# Patient Record
Sex: FEMALE | Race: AMERICAN INDIAN OR ALASKA NATIVE
[De-identification: names, ages, dates, MRNs, and addresses within clinical notes are randomized per-mention and may not be internally consistent; named-entity substitution may affect disease eponyms.]

---

## 2019-10-18 ENCOUNTER — HOSPITAL ENCOUNTER (EMERGENCY)
Dept: HOSPITAL 5 - ED | Age: 29
Discharge: OTHER INSTITUTION | End: 2019-10-18
Payer: COMMERCIAL

## 2019-10-18 VITALS — DIASTOLIC BLOOD PRESSURE: 40 MMHG | SYSTOLIC BLOOD PRESSURE: 98 MMHG

## 2019-10-18 DIAGNOSIS — J86.9: Primary | ICD-10-CM

## 2019-10-18 DIAGNOSIS — F17.200: ICD-10-CM

## 2019-10-18 LAB
ALBUMIN SERPL-MCNC: 3.7 G/DL (ref 3.9–5)
ALT SERPL-CCNC: 36 UNITS/L (ref 7–56)
BAND NEUTROPHILS # (MANUAL): 0.1 K/MM3
BILIRUB DIRECT SERPL-MCNC: 0.3 MG/DL (ref 0–0.2)
BUN SERPL-MCNC: 11 MG/DL (ref 7–17)
BUN/CREAT SERPL: 22 %
CALCIUM SERPL-MCNC: 8.9 MG/DL (ref 8.4–10.2)
HCT VFR BLD CALC: 39.6 % (ref 30.3–42.9)
HEMOLYSIS INDEX: 9
HGB BLD-MCNC: 12.9 GM/DL (ref 10.1–14.3)
MCHC RBC AUTO-ENTMCNC: 33 % (ref 30–34)
MCV RBC AUTO: 88 FL (ref 79–97)
MYELOCYTES # (MANUAL): 0 K/MM3
PLATELET # BLD: 430 K/MM3 (ref 140–440)
PROMYELOCYTES # (MANUAL): 0 K/MM3
RBC # BLD AUTO: 4.51 M/MM3 (ref 3.65–5.03)
TOTAL CELLS COUNTED BLD: 200

## 2019-10-18 PROCEDURE — 96365 THER/PROPH/DIAG IV INF INIT: CPT

## 2019-10-18 PROCEDURE — 99291 CRITICAL CARE FIRST HOUR: CPT

## 2019-10-18 PROCEDURE — 96376 TX/PRO/DX INJ SAME DRUG ADON: CPT

## 2019-10-18 PROCEDURE — 85025 COMPLETE CBC W/AUTO DIFF WBC: CPT

## 2019-10-18 PROCEDURE — 80076 HEPATIC FUNCTION PANEL: CPT

## 2019-10-18 PROCEDURE — 71045 X-RAY EXAM CHEST 1 VIEW: CPT

## 2019-10-18 PROCEDURE — 96375 TX/PRO/DX INJ NEW DRUG ADDON: CPT

## 2019-10-18 PROCEDURE — 36415 COLL VENOUS BLD VENIPUNCTURE: CPT

## 2019-10-18 PROCEDURE — 82140 ASSAY OF AMMONIA: CPT

## 2019-10-18 PROCEDURE — 85007 BL SMEAR W/DIFF WBC COUNT: CPT

## 2019-10-18 PROCEDURE — 87040 BLOOD CULTURE FOR BACTERIA: CPT

## 2019-10-18 PROCEDURE — 80048 BASIC METABOLIC PNL TOTAL CA: CPT

## 2019-10-18 NOTE — EMERGENCY DEPARTMENT REPORT
ED General Adult HPI





- General


Chief complaint: Dyspnea/Respdistress


Stated complaint: PAIN,MABLE


Time Seen by Provider: 10/18/19 19:57


Source: patient


Mode of arrival: Ambulatory


Limitations: No Limitations





- History of Present Illness


Initial comments: 


29 y.o. female with recent history of a pneumothorax with chest tube placement 

and removal of chest tube yesterday as well as treatment for pneumonia and the 

flu at [facility] presents with complaint of shortness of breath.  Patient 

states that she has had worsening shortness of breath since 5 this morning.  

Patient was discharged yesterday from [facility] after having a pneumothorax.

Patient was evaluated by Dr. Chairez with Thoracic surgery at that facility who 

transitioned a pigtail catheter that was initially placed to a chest tube in 

right chest.  Patient states that she was admitted for 2 days.  Patient denies 

any fever.  Patient states her pain is a 9 out of 10.  Patient states that it 

hurts to breathe on the right side.  Patient denies any recent trauma or falls.





- Related Data


                                    Allergies











Allergy/AdvReac Type Severity Reaction Status Date / Time


 


No Known Allergies Allergy   Unverified 10/18/19 19:27














ED Review of Systems


ROS: 


Stated complaint: PAIN,MABLE


Other details as noted in HPI





Constitutional: denies: chills, fever


Eyes: denies: eye pain, eye discharge, vision change


ENT: denies: ear pain, throat pain


Respiratory: cough, SOB at rest


Cardiovascular: denies: chest pain, palpitations


Endocrine: no symptoms reported


Gastrointestinal: denies: abdominal pain, nausea, diarrhea


Genitourinary: denies: urgency, dysuria, discharge


Musculoskeletal: denies: back pain, joint swelling, arthralgia


Skin: denies: rash, lesions


Neurological: denies: headache, weakness, paresthesias


Psychiatric: denies: anxiety, depression


Hematological/Lymphatic: denies: easy bleeding, easy bruising





ED Past Medical Hx





- Past Medical History


Previous Medical History?: Yes


Additional medical history: pneumonia,





- Surgical History


Past Surgical History?: Yes


Additional Surgical History: chest tube





- Social History


Smoking Status: Current Some Day Smoker


Substance Use Type: Alcohol





ED Physical Exam





- General


Limitations: No Limitations


General appearance: alert, other (uncomfortable; dehydrated)





- Head


Head exam: Present: atraumatic, normocephalic





- Eye


Eye exam: Present: normal appearance





- ENT


ENT exam: Present: mucous membranes moist





- Neck


Neck exam: Present: normal inspection





- Respiratory


Respiratory exam: Present: respiratory distress, chest wall tenderness, decr

eased breath sounds (on right side)





- Cardiovascular


Cardiovascular Exam: Present: normal rhythm, tachycardia.  Absent: systolic 

murmur, diastolic murmur, rubs, gallop





- GI/Abdominal


GI/Abdominal exam: Present: soft, normal bowel sounds





- Extremities Exam


Extremities exam: Present: normal inspection





- Back Exam


Back exam: Present: normal inspection





- Neurological Exam


Neurological exam: Present: alert, oriented X3





- Psychiatric


Psychiatric exam: Present: normal affect, normal mood





- Skin


Skin exam: Present: warm, dry, intact, normal color.  Absent: rash





ED Course


                                   Vital Signs











  10/18/19 10/18/19 10/18/19





  19:28 19:54 19:55


 


Temperature 98.8 F  


 


Pulse Rate 137 H 129 H 129 H


 


Respiratory 22 23 38 H





Rate   


 


Blood Pressure 98/53  108/66


 


O2 Sat by Pulse 96 96 95





Oximetry   














  10/18/19 10/18/19 10/18/19





  20:00 20:05 20:10


 


Temperature   


 


Pulse Rate 125 H 129 H 123 H


 


Respiratory 20 37 H 36 H





Rate   


 


Blood Pressure 116/57 93/55 113/54


 


O2 Sat by Pulse 94 96 98





Oximetry   














  10/18/19 10/18/19 10/18/19





  20:15 20:20 20:21


 


Temperature   


 


Pulse Rate 124 H 118 H 


 


Respiratory 46 H 45 H 25 H





Rate   


 


Blood Pressure 105/64 102/66 


 


O2 Sat by Pulse 98 97 98





Oximetry   














  10/18/19 10/18/19 10/18/19





  20:25 20:30 20:35


 


Temperature   


 


Pulse Rate 119 H 113 H 115 H


 


Respiratory 29 H 41 H 31 H





Rate   


 


Blood Pressure 102/66 109/64 102/72


 


O2 Sat by Pulse 97 99 98





Oximetry   














  10/18/19 10/18/19 10/18/19





  20:41 20:45 20:50


 


Temperature   


 


Pulse Rate 118 H 120 H 122 H


 


Respiratory 40 H 36 H 35 H





Rate   


 


Blood Pressure 100/71 100/71 117/46


 


O2 Sat by Pulse 99 98 97





Oximetry   














  10/18/19 10/18/19 10/18/19





  20:55 21:01 21:05


 


Temperature   


 


Pulse Rate 127 H 126 H 130 H


 


Respiratory 29 H 31 H 22





Rate   


 


Blood Pressure 117/46 175/143 175/143


 


O2 Sat by Pulse 97 67 L 96





Oximetry   














  10/18/19 10/18/19 10/18/19





  21:11 21:15 21:21


 


Temperature   


 


Pulse Rate 128 H 124 H 126 H


 


Respiratory 22 39 H 31 H





Rate   


 


Blood Pressure 127/47 133/44 124/59


 


O2 Sat by Pulse 98 100 98





Oximetry   














  10/18/19 10/18/19





  21:25 21:31


 


Temperature  


 


Pulse Rate 124 H 116 H


 


Respiratory 44 H 22





Rate  


 


Blood Pressure 124/52 98/40


 


O2 Sat by Pulse 98 97





Oximetry  














ED Medical Decision Making





- Lab Data


Result diagrams: 


                                 10/18/19 19:51





                                 10/18/19 19:51





- Medical Decision Making


Patient noted to have a loculated pleural effusion encompassing the right long. 

There is no deviation of the trachea.  There is no obvious pneumothorax present.

 This patient recently was about thoracic surgery at [facility] I contacted 

this thoracic surgeon who agreed to see the patient at [facility].  Patient's

case was discussed with [place] transfer line as well as the ER physician at that 

facility.  Patient was accepted for transfer.  Patient had recently been 

evaluated by thoracic surgery and as it had not been 24 hours since in her 

developing this possible empyema we'll go ahead and transfer patient to the 

hospital for continued management and treatment.  Patient was also given Zosyn 

therapy as she was noted to have an elevated white count.  Patient has had no 

fever however.  Patient initially received a total of 8 mg of morphine which 

somewhat diminished her pain.  Patient then received 2 of Dilaudid which helped 

bring her pain down more significantly.





- Differential Diagnosis


Pneumothorax; Empyema; Pneumonia; Dehydration


Critical Care Time: Yes


Critical care time in (mins) excluding proc time.: 38


Critical care attestation.: 


If time is entered above; I have spent that time in minutes in the direct care 

of this critically ill patient, excluding procedure time.


Critical care time includes time spent on physician consultation, direct bedside

care, and frequent reassessment. 





ED Disposition


Clinical Impression: 


 Empyema lung, Acute respiratory disease





Disposition: [id number]/TX-70 ANOTHER TYPE HLTHCARE


Is pt being admited?: No


Does the pt Need Aspirin: No


Condition: Fair


Referrals: 


[facility], [place] [Primary Care Provider] - 3-5 Days


Time of Disposition: 22:25


Print Language: ENGLISH

## 2019-10-18 NOTE — EVENT NOTE
ED Screening Note


Date of service: 10/18/19


Time: 19:40


ED Screening Note: 


Pt complains of right side pain, nausea, dizziness, and SOB x today. Had chest 

tube removed on Wednesday-admitted x 13 days for pneumothorax per pt





This initial assessment/diagnostic orders/clinical plan/treatment(s) is/are 

subject to change based on patients health status, clinical progression and re-

assessment by fellow clinical providers in the ED. Further treatment and workup 

at subsequent clinical providers discretion. Patient/guardian urged not to elope

from the ED as their condition may be serious if not clinically assessed and 

managed. 





Initial orders include: 


CBC


BMP


lactic acid


CXR


meds

## 2019-10-18 NOTE — XRAY REPORT
CHEST 1 VIEW 



INDICATION / CLINICAL INFORMATION:

MAIN: shortness of breath SOB and generalized pain, pt stated she was D/C from [place] yesterday for Fl
u, pneumonia also C/O pain to right side.  had chest tube right side.



COMPARISON: 

None available.



FINDINGS:



SUPPORT DEVICES: None.



HEART / MEDIASTINUM: Normal heart size. 



LUNGS / PLEURA: Patchy and linear airspace opacities throughout the right lung. Moderate to large lef
t pleural fluid collection is present, possibly loculated with lateral and subpulmonic components. No
 evident pneumothorax. Scattered linear opacities are present in the left lung, likely related to ate
lectasis and/or scarring.



ADDITIONAL FINDINGS: No significant additional findings.



IMPRESSION:

1. Moderate to large left pleural fluid collection, likely loculated, with differential including emp
yema and hemothorax.

2. Diffuse right lung airspace opacities may reflect any combination of atelectasis, scarring, pneumo
tano, aspirated material, pulmonary hemorrhage.

 



Signer Name: Delon Philippe MD 

Signed: 10/18/2019 8:17 PM

 Workstation Name: [id number]